# Patient Record
Sex: FEMALE | Race: WHITE | NOT HISPANIC OR LATINO | URBAN - METROPOLITAN AREA
[De-identification: names, ages, dates, MRNs, and addresses within clinical notes are randomized per-mention and may not be internally consistent; named-entity substitution may affect disease eponyms.]

---

## 2018-05-27 ENCOUNTER — EMERGENCY (EMERGENCY)
Facility: HOSPITAL | Age: 33
LOS: 1 days | Discharge: HOME | End: 2018-05-27
Attending: EMERGENCY MEDICINE | Admitting: EMERGENCY MEDICINE
Payer: COMMERCIAL

## 2018-05-27 VITALS
DIASTOLIC BLOOD PRESSURE: 80 MMHG | RESPIRATION RATE: 15 BRPM | TEMPERATURE: 98 F | HEART RATE: 63 BPM | OXYGEN SATURATION: 98 % | SYSTOLIC BLOOD PRESSURE: 131 MMHG

## 2018-05-27 LAB — HCG UR QL: NEGATIVE — SIGNIFICANT CHANGE UP

## 2018-05-27 PROCEDURE — 93010 ELECTROCARDIOGRAM REPORT: CPT

## 2018-05-27 PROCEDURE — 99284 EMERGENCY DEPT VISIT MOD MDM: CPT | Mod: 25

## 2018-05-27 PROCEDURE — 99053 MED SERV 10PM-8AM 24 HR FAC: CPT

## 2018-05-27 NOTE — ED PROVIDER NOTE - OBJECTIVE STATEMENT
pt had drink with mescal then felt epigastric and stomach cramping, did some stretches, then sat on bar stool, felt hot, sweaty and dizzy and passed out, no CP/SOB.abd pain, no n/v/d/f/c, no urinary sxs, fell and hit head and L hip, now only mild headache, mild hip ache, no neck pain, paresthesias or focal weakness pt had drink with mescal then felt epigastric and suprapubic stomach cramping, like bad menstrual cramps, did some stretches, then sat on bar stool, felt hot, sweaty and dizzy and passed out, no CP/SOB.abd pain, no n/v/d/f/c, no urinary sxs, fell and hit head and L hip, now only mild headache, mild hip ache, no neck pain, paresthesias or focal weakness. LMP now.

## 2018-05-27 NOTE — ED PROVIDER NOTE - MEDICAL DECISION MAKING DETAILS
syncope, possibly vasovagal vs orthostatic, not concerned for acs, PE, dissection, VSS well appearing, will send urine pregnancy, reassess

## 2018-05-27 NOTE — ED PROVIDER NOTE - PROGRESS NOTE DETAILS
pain resolved, ucg negative, patient feels improved, wants to go home, tolerating PO, will discharge with strict return precautions, follow up with PMD

## 2018-05-27 NOTE — ED ADULT TRIAGE NOTE - CHIEF COMPLAINT QUOTE
Pt brought in by EMS after a syncopal episode from a seated position.  Prior to episode, pt with complaint of epigastric pain, went to the bathroom, then sat down and felt dizzy and warm. Pt with complaint to right side of forehead. Pt admits to drinking alcohol and smoking unknown substance tonight.

## 2018-05-31 DIAGNOSIS — R10.13 EPIGASTRIC PAIN: ICD-10-CM

## 2018-05-31 DIAGNOSIS — R55 SYNCOPE AND COLLAPSE: ICD-10-CM

## 2018-05-31 DIAGNOSIS — R42 DIZZINESS AND GIDDINESS: ICD-10-CM
